# Patient Record
Sex: MALE | Race: BLACK OR AFRICAN AMERICAN | NOT HISPANIC OR LATINO | Employment: FULL TIME | URBAN - METROPOLITAN AREA
[De-identification: names, ages, dates, MRNs, and addresses within clinical notes are randomized per-mention and may not be internally consistent; named-entity substitution may affect disease eponyms.]

---

## 2018-03-20 ENCOUNTER — CLINICAL SUPPORT (OUTPATIENT)
Dept: OCCUPATIONAL MEDICINE | Facility: CLINIC | Age: 28
End: 2018-03-20

## 2018-03-20 DIAGNOSIS — Z02.1 PRE-EMPLOYMENT EXAMINATION: Primary | ICD-10-CM

## 2018-03-20 PROCEDURE — 99499 UNLISTED E&M SERVICE: CPT | Mod: S$GLB,,, | Performed by: PREVENTIVE MEDICINE

## 2018-03-20 PROCEDURE — 80305 DRUG TEST PRSMV DIR OPT OBS: CPT | Mod: S$GLB,,, | Performed by: PREVENTIVE MEDICINE

## 2020-04-30 ENCOUNTER — HOSPITAL ENCOUNTER (EMERGENCY)
Facility: HOSPITAL | Age: 30
Discharge: HOME | End: 2020-04-30
Attending: EMERGENCY MEDICINE

## 2020-04-30 VITALS
SYSTOLIC BLOOD PRESSURE: 123 MMHG | WEIGHT: 225 LBS | BODY MASS INDEX: 32.21 KG/M2 | OXYGEN SATURATION: 98 % | HEIGHT: 70 IN | RESPIRATION RATE: 16 BRPM | HEART RATE: 67 BPM | DIASTOLIC BLOOD PRESSURE: 84 MMHG | TEMPERATURE: 99 F

## 2020-04-30 DIAGNOSIS — K04.7 INFECTED DENTAL CARIES: Primary | ICD-10-CM

## 2020-04-30 DIAGNOSIS — K02.9 INFECTED DENTAL CARIES: Primary | ICD-10-CM

## 2020-04-30 PROCEDURE — 99284 EMERGENCY DEPT VISIT MOD MDM: CPT | Mod: ER

## 2020-04-30 RX ORDER — ACETAMINOPHEN AND CODEINE PHOSPHATE 300; 30 MG/1; MG/1
1 TABLET ORAL EVERY 12 HOURS PRN
Qty: 6 TABLET | Refills: 0 | Status: SHIPPED | OUTPATIENT
Start: 2020-04-30 | End: 2020-05-10

## 2020-04-30 RX ORDER — IBUPROFEN 600 MG/1
600 TABLET ORAL EVERY 6 HOURS PRN
Qty: 20 TABLET | Refills: 0 | Status: SHIPPED | OUTPATIENT
Start: 2020-04-30

## 2020-04-30 RX ORDER — PENICILLIN V POTASSIUM 500 MG/1
500 TABLET, FILM COATED ORAL 4 TIMES DAILY
Qty: 40 TABLET | Refills: 0 | Status: SHIPPED | OUTPATIENT
Start: 2020-04-30 | End: 2020-05-10

## 2020-05-01 NOTE — ED TRIAGE NOTES
Pt c/o tooth ache, broken tooth in front with pieces of tooth still present in gum, swelling and generalized mouth pain x2 days. Pt reports this is a chronic problem for a few months and pt was seen by dentist, but unable to have procedure performed d/t offices closed.

## 2020-05-01 NOTE — ED PROVIDER NOTES
Encounter Date: 4/30/2020    SCRIBE #1 NOTE: I, Sanjay Domingo, am scribing for, and in the presence of,  NIEVES Hagan. I have scribed the following portions of the note - Other sections scribed: HPI, ROS, PE.       History     Chief Complaint   Patient presents with    Dental Pain     pt presents to ED with c/o oral/tooth pain that started last night without relief from OTC medications. Ulcerations noted to left upper front of gums     29 year old male presents to the ED with complaints of pain to his front teeth after a piece of it broke off several weeks ago. He reports that the pain worsened yesterday. Denies any relief with OTC medications. Patient denies fever, fatigue, chest pain, shortness of breath, abdominal pain, nausea, vomiting, diarrhea, dysuria, hematuria, rash, numbness, weakness, or tingling.  Patient us unable to schedule a dentist appointment due to COVID-19.    The history is provided by the patient. No  was used.     Review of patient's allergies indicates:  No Known Allergies  No past medical history on file.  No past surgical history on file.  No family history on file.  Social History     Tobacco Use    Smoking status: Not on file   Substance Use Topics    Alcohol use: Not on file    Drug use: Not on file     Review of Systems   Constitutional: Negative for chills, fatigue and fever.   HENT: Positive for dental problem. Negative for congestion, ear pain, rhinorrhea and sore throat.    Eyes: Negative for pain, discharge and redness.   Respiratory: Negative for cough and shortness of breath.    Cardiovascular: Negative for chest pain.   Gastrointestinal: Negative for abdominal pain, diarrhea, nausea and vomiting.   Genitourinary: Negative for dysuria and hematuria.   Musculoskeletal: Negative for back pain, neck pain and neck stiffness.   Skin: Negative for rash.   Neurological: Negative for dizziness, weakness, light-headedness, numbness and headaches.    Psychiatric/Behavioral: Negative for confusion.       Physical Exam     Initial Vitals [04/30/20 2100]   BP Pulse Resp Temp SpO2   123/84 67 16 99.3 °F (37.4 °C) 98 %      MAP       --         Physical Exam    Nursing note and vitals reviewed.  Constitutional: Vital signs are normal. He appears well-developed and well-nourished. He is cooperative.  Non-toxic appearance. He does not appear ill.   HENT:   Head: Normocephalic and atraumatic.   Right Ear: Tympanic membrane and external ear normal.   Left Ear: Tympanic membrane and external ear normal.   Nose: Nose normal.   Mouth/Throat: Uvula is midline, oropharynx is clear and moist and mucous membranes are normal. No trismus in the jaw. Dental caries present. No dental abscesses.   Multiple dental carries; no dental abscess, peritonsillar abscess, facial swelling, cellulitis, isabela's angina, or trismus. Uvula is midline   Eyes: Conjunctivae are normal.   Neck: Normal range of motion. Neck supple.   Cardiovascular: Normal rate, regular rhythm and normal heart sounds. Exam reveals no gallop and no friction rub.    No murmur heard.  Pulmonary/Chest: Effort normal and breath sounds normal. No respiratory distress. He has no wheezes. He has no rhonchi. He has no rales.   Abdominal: Soft. Normal appearance and bowel sounds are normal. There is no tenderness. There is no CVA tenderness.   Musculoskeletal: Normal range of motion.   Neurological: He is alert and oriented to person, place, and time. GCS eye subscore is 4. GCS verbal subscore is 5. GCS motor subscore is 6.   Skin: Skin is warm, dry and intact. No rash noted.   Psychiatric: He has a normal mood and affect. His speech is normal and behavior is normal. Judgment and thought content normal.         ED Course   Procedures  Labs Reviewed - No data to display       Imaging Results    None          Medical Decision Making:   History:   Old Medical Records: I decided to obtain old medical records.       APC / Resident  Notes:   This is an evaluation of a 29 y.o. male that presents to the Emergency Department for dental pain    Physical Exam shows a non-toxic, afebrile, and well appearing male. Multiple dental carries; no dental abscess, peritonsillar abscess, facial swelling, cellulitis, isabela's angina, or trismus. Uvula is midline    Vital signs are reassuring. If available, previous records reviewed.     My overall impression is infected dental caries. I considered, but at this time, do not suspect dental abscess, facial cellulitis, PTA, dental abscess.    ED Course: PE. D/C Meds: PCN VK, tylenol #3, Ibuprofen. D/C Information: f/u, medications. The diagnosis, treatment plan, instructions for follow-up and reevaluation with Dentist as well as ED return precautions were discussed and understanding was verbalized. All questions or concerns have been addressed.            Scribe Attestation:   Scribe #1: I performed the above scribed service and the documentation accurately describes the services I performed. I attest to the accuracy of the note.    Physician Attestation for Scribe:  Physician Attestation Statement for Scribe: I, NIEVES Hagan, reviewed documentation, as scribed by Sanjay Domingo in my presence, and it is both accurate and complete.                         Clinical Impression:     1. Infected dental caries      Disposition:   Disposition: Discharged  Condition: Stable     ED Disposition Condition    Discharge Stable        ED Prescriptions     Medication Sig Dispense Start Date End Date Auth. Provider    penicillin v potassium (VEETID) 500 MG tablet Take 1 tablet (500 mg total) by mouth 4 (four) times daily. for 10 days 40 tablet 4/30/2020 5/10/2020 NIEVES Avila    ibuprofen (ADVIL,MOTRIN) 600 MG tablet Take 1 tablet (600 mg total) by mouth every 6 (six) hours as needed. 20 tablet 4/30/2020  NIEVES Avila    acetaminophen-codeine 300-30mg (TYLENOL #3) 300-30 mg Tab Take 1 tablet by mouth every 12 (twelve)  hours as needed. 6 tablet 4/30/2020 5/10/2020 NIEVES Avila        Follow-up Information     Follow up With Specialties Details Why Contact Info    [facility]  Schedule an appointment as soon as possible for a visit in 2 days  [address] [facility] [place] [address]  [phone]      [facility] [place] [facility] Emergency Medicine Go to  If symptoms worsen [address] [facility] [id number] [phone]                                     NIEVES Avila  04/30/20 2124